# Patient Record
Sex: FEMALE | Race: WHITE | NOT HISPANIC OR LATINO | URBAN - METROPOLITAN AREA
[De-identification: names, ages, dates, MRNs, and addresses within clinical notes are randomized per-mention and may not be internally consistent; named-entity substitution may affect disease eponyms.]

---

## 2019-11-20 PROBLEM — 40739000 DYSPHAGIA: Status: ACTIVE | Noted: 2019-11-20

## 2019-11-20 PROBLEM — 266464001 HEMORRHAGE OF RECTUM AND ANUS: Status: ACTIVE | Noted: 2019-11-20

## 2019-11-20 PROBLEM — 30037006 ANAL FISSURE: Status: ACTIVE | Noted: 2019-11-20

## 2019-11-20 PROBLEM — 102485007 PERSONAL RISK FACTOR: Status: ACTIVE | Noted: 2019-11-20

## 2019-12-12 PROBLEM — 266435005 GASTRO-ESOPHAGEAL REFLUX DISEASE WITHOUT ESOPHAGITIS: Status: ACTIVE | Noted: 2019-12-12

## 2020-03-10 PROBLEM — 426546008 SACROCOCCYGEAL DISORDER: Status: ACTIVE | Noted: 2020-03-10

## 2021-12-16 ENCOUNTER — OFFICE VISIT (OUTPATIENT)
Dept: URBAN - METROPOLITAN AREA CLINIC 27 | Facility: CLINIC | Age: 36
End: 2021-12-16

## 2021-12-16 PROBLEM — 442076002 EARLY SATIETY: Status: ACTIVE | Noted: 2021-12-16

## 2022-01-13 ENCOUNTER — OFFICE VISIT (OUTPATIENT)
Dept: URBAN - METROPOLITAN AREA CLINIC 27 | Facility: CLINIC | Age: 37
End: 2022-01-13

## 2022-01-13 PROBLEM — 301717006 RIGHT UPPER QUADRANT PAIN: Status: ACTIVE | Noted: 2022-01-13

## 2022-01-26 ENCOUNTER — OFFICE VISIT (OUTPATIENT)
Dept: URBAN - METROPOLITAN AREA CLINIC 27 | Facility: CLINIC | Age: 37
End: 2022-01-26

## 2022-02-11 ENCOUNTER — OFFICE VISIT (OUTPATIENT)
Dept: URBAN - METROPOLITAN AREA CLINIC 27 | Facility: CLINIC | Age: 37
End: 2022-02-11

## 2022-04-13 ENCOUNTER — OFFICE VISIT (OUTPATIENT)
Dept: URBAN - METROPOLITAN AREA CLINIC 27 | Facility: CLINIC | Age: 37
End: 2022-04-13

## 2022-04-30 ENCOUNTER — TELEPHONE ENCOUNTER (OUTPATIENT)
Dept: URBAN - METROPOLITAN AREA CLINIC 121 | Facility: CLINIC | Age: 37
End: 2022-04-30

## 2022-04-30 RX ORDER — IBUPROFEN 200 MG
TABLET ORAL
OUTPATIENT
Start: 2019-11-20 | End: 2021-12-16

## 2022-04-30 RX ORDER — AMOXICILLIN AND CLAVULANATE POTASSIUM 250; 125 MG/1; MG/1
ONE TAB BY MOUTH TWICE DAILY TABLET, FILM COATED ORAL
OUTPATIENT
Start: 2020-02-13 | End: 2020-02-23

## 2022-05-01 ENCOUNTER — TELEPHONE ENCOUNTER (OUTPATIENT)
Dept: URBAN - METROPOLITAN AREA CLINIC 121 | Facility: CLINIC | Age: 37
End: 2022-05-01

## 2022-05-01 RX ORDER — ELECTROLYTES/DEXTROSE
SOLUTION, ORAL ORAL
Status: ACTIVE | COMMUNITY

## 2022-07-15 ENCOUNTER — LAB OUTSIDE AN ENCOUNTER (OUTPATIENT)
Dept: URBAN - METROPOLITAN AREA CLINIC 27 | Facility: CLINIC | Age: 37
End: 2022-07-15

## 2022-07-18 ENCOUNTER — LAB OUTSIDE AN ENCOUNTER (OUTPATIENT)
Dept: URBAN - METROPOLITAN AREA CLINIC 27 | Facility: CLINIC | Age: 37
End: 2022-07-18

## 2022-07-18 ENCOUNTER — WEB ENCOUNTER (OUTPATIENT)
Dept: URBAN - METROPOLITAN AREA CLINIC 98 | Facility: CLINIC | Age: 37
End: 2022-07-18

## 2022-07-18 ENCOUNTER — OFFICE VISIT (OUTPATIENT)
Dept: URBAN - METROPOLITAN AREA CLINIC 98 | Facility: CLINIC | Age: 37
End: 2022-07-18
Payer: COMMERCIAL

## 2022-07-18 VITALS
TEMPERATURE: 97.4 F | SYSTOLIC BLOOD PRESSURE: 95 MMHG | WEIGHT: 127 LBS | HEART RATE: 74 BPM | HEIGHT: 66 IN | BODY MASS INDEX: 20.41 KG/M2 | DIASTOLIC BLOOD PRESSURE: 64 MMHG

## 2022-07-18 DIAGNOSIS — R79.0 LOW FERRITIN: ICD-10-CM

## 2022-07-18 DIAGNOSIS — R10.9 RIGHT SIDED ABDOMINAL PAIN: ICD-10-CM

## 2022-07-18 DIAGNOSIS — K59.09 CHRONIC CONSTIPATION: ICD-10-CM

## 2022-07-18 PROCEDURE — 99214 OFFICE O/P EST MOD 30 MIN: CPT | Performed by: INTERNAL MEDICINE

## 2022-07-18 RX ORDER — ELECTROLYTES/DEXTROSE
SOLUTION, ORAL ORAL
Status: ACTIVE | COMMUNITY

## 2022-07-18 NOTE — HPI-TODAY'S VISIT:
She has had right sided abdominal pain for over 4 months.She is seen by Federico Swanson.   The pain began after taking Macrobid for UTI in December 2022. Pain has radiated from upper abdomen to lower abdomen. Liver labs were normal.  No findings on 3/8/22 CT to account for pain.  Pain thought to be functional vs. MSK, less likely IBD. Was to try IBGard and get FOBT. Stool kit given 7/15/22 (has not done this yet).   She is still having the pain. Most of the time she does not feel it.  She will feel it for an hour a few times per week.  It is mild (2/10).  She feels it when she presses. Eating and BMs do not change her symptoms.  She is having a BM every day, unsure if she is completely evacuating.  She is unsure what makes it feel better.  She has tried a probiotic.  For 2 years she has had early satiety with large meals. No vomiting. No NSAID use.  No weight loss.  No blood in the stools.  She has dysautonomia and reactive hypoglycemia.  She has had low ferritin in the past, heavy periods.   I reviewed:  3/8/22 CT A/P with IV contrast: moderate fecal stool burden

## 2022-07-19 LAB
FERRITIN, SERUM: 10
HEMATOCRIT: 41.6
HEMOGLOBIN: 13.7
IRON BIND.CAP.(TIBC): 373
IRON SATURATION: 23
IRON: 86
MCH: 29.8
MCHC: 32.9
MCV: 90.6
MPV: 11
PLATELET COUNT: 225
RDW: 12.1
RED BLOOD CELL COUNT: 4.59
WHITE BLOOD CELL COUNT: 6.2

## 2022-07-20 ENCOUNTER — TELEPHONE ENCOUNTER (OUTPATIENT)
Dept: URBAN - METROPOLITAN AREA CLINIC 6 | Facility: CLINIC | Age: 37
End: 2022-07-20

## 2022-07-20 ENCOUNTER — TELEPHONE ENCOUNTER (OUTPATIENT)
Dept: URBAN - METROPOLITAN AREA CLINIC 98 | Facility: CLINIC | Age: 37
End: 2022-07-20

## 2022-07-21 LAB
CAMPYLOBACTER SPP. AG,EIA: (no result)
CLOSTRIDIUM DIFFICILE: (no result)
FECAL FAT, QUALITATIVE: NORMAL
LEUKOCYTES: (no result)
Lab: (no result)
OVA AND PARASITES, CONC AND PERM SMEAR: (no result)
SALMONELLA AND SHIGELLA, CULTURE: (no result)
SHIGA TOXINS, EIA W/RFL TO E.COLI O157 CULTURE: (no result)

## 2022-07-22 LAB — OCCULT BLOOD, FECAL, IA: (no result)

## 2022-08-19 ENCOUNTER — OFFICE VISIT (OUTPATIENT)
Dept: URBAN - METROPOLITAN AREA TELEHEALTH 2 | Facility: TELEHEALTH | Age: 37
End: 2022-08-19
Payer: COMMERCIAL

## 2022-08-19 ENCOUNTER — TELEPHONE ENCOUNTER (OUTPATIENT)
Dept: URBAN - METROPOLITAN AREA CLINIC 6 | Facility: CLINIC | Age: 37
End: 2022-08-19

## 2022-08-19 VITALS — WEIGHT: 127 LBS | BODY MASS INDEX: 20.41 KG/M2 | HEIGHT: 66 IN

## 2022-08-19 DIAGNOSIS — K59.09 CHRONIC CONSTIPATION: ICD-10-CM

## 2022-08-19 DIAGNOSIS — R10.84 ABDOMINAL CRAMPING, GENERALIZED: ICD-10-CM

## 2022-08-19 DIAGNOSIS — R79.0 LOW FERRITIN: ICD-10-CM

## 2022-08-19 PROCEDURE — 99214 OFFICE O/P EST MOD 30 MIN: CPT | Performed by: INTERNAL MEDICINE

## 2022-08-19 RX ORDER — POLYETHYLENE GLYCOL 3350, SODIUM CHLORIDE, SODIUM BICARBONATE, POTASSIUM CHLORIDE 420; 11.2; 5.72; 1.48 G/4L; G/4L; G/4L; G/4L
AS DIRECTED POWDER, FOR SOLUTION ORAL ONCE
Qty: 420 GM | Refills: 0 | OUTPATIENT
Start: 2022-08-19 | End: 2022-08-20

## 2022-08-19 RX ORDER — ELECTROLYTES/DEXTROSE
SOLUTION, ORAL ORAL
COMMUNITY

## 2022-08-19 NOTE — HPI-TODAY'S VISIT:
Ms. Lima is a 37 yo F presnting for followup.  She is now taking iron supplementation for low ferritin.  She did this before and it increased her ferritin but then she stopped supplementation and her ferritin dropped to 10 again.  She is not sure if her periods are still heavy at this time.  Still having right sided abdominal pain daily and randomly throughout the day. Unrelated to food or eating.  I reviewed:  22 H/H 13.7/41.6, MCV 90.6, plts 225 22 ferritin 10 (low), iron 86 (normal) 22 fecal globin: normal  22 visit: She has had right sided abdominal pain for over 4 months.She is seen by Federico Swanson.   The pain began after taking Macrobid for UTI in 2022. Pain has radiated from upper abdomen to lower abdomen. Liver labs were normal.  No findings on 3/8/22 CT to account for pain.  Pain thought to be functional vs. MSK, less likely IBD. Was to try IBGard and get FOBT. Stool kit given 7/15/22 (has not done this yet).   She is still having the pain. Most of the time she does not feel it.  She will feel it for an hour a few times per week.  It is mild (2/10).  She feels it when she presses. Eating and BMs do not change her symptoms.  She is having a BM every day, unsure if she is completely evacuating.  She is unsure what makes it feel better.  She has tried a probiotic.  For 2 years she has had early satiety with large meals. No vomiting. No NSAID use.  No weight loss.  No blood in the stools.  She has dysautonomia and reactive hypoglycemia.  She has had low ferritin in the past, heavy periods.   I reviewed:  3/8/22 CT A/P with IV contrast: moderate fecal stool burden   Patient seen today via telehealth by agreement and consent of patient in light of current COVID-19 pandemic. I used video conferencing during the visit. The patient encounter is appropriate and reasonable under the circumstances given the patient's particular presentation at this time. The patient has been advised of the followin) the potential risks and limitations of this mode of treatment (including but not limited to the absence of in-person examination); 2) the right to refuse telehealth services at any point without affecting the right to future care; 3) the right to receive in-person services, included immediately after this consultation if an urgent need arises; 4) information, including identifiable images or information from this telehealth consult, will only be shared in accordance with HIPPA regulations. Any and all of the patient's and/or patient's family member's questions on this issue have been answered. The patient has verbally consented to be treated via telehealth services. The patient has also been advised to contact this office for worsening conditions or problems, and seek emergency medical treatment and/or call 911 if the patient deems either necessary.   More than half of the face-to-face time used for counseling and coordination of care.  The patient received telehealth services at: home

## 2022-08-19 NOTE — EXAM-FUNCTIONAL ASSESSMENT
Constitutional: well-developed, normal communication ability.   Eyes: Conjunctivae and eyelids appear normal, no scleral icterus.   Nose/nasopharynx, external nose: appear normal, normal appearance of lips.   Neck/thyroid: normal appearance   Chest: No visualized lesions or deformities.   Gastrointestinal:right sided abdominal tenderness on self palpation   Musculoskeletal: no visualized joint erythema or swelling   Skin: no rashes or jaundice   Neurologic: Oriented to person, place, time.    Psychiatric: Normal mood and appropriate affect.

## 2022-08-29 ENCOUNTER — LAB OUTSIDE AN ENCOUNTER (OUTPATIENT)
Dept: URBAN - METROPOLITAN AREA CLINIC 98 | Facility: CLINIC | Age: 37
End: 2022-08-29

## 2022-08-30 ENCOUNTER — WEB ENCOUNTER (OUTPATIENT)
Dept: URBAN - METROPOLITAN AREA CLINIC 98 | Facility: CLINIC | Age: 37
End: 2022-08-30

## 2022-08-31 LAB
IMMUNOGLOBULIN A, QN, SERUM: 102
INTERPRETATION: (no result)
T-TRANSGLUTAMINASE (TTG) IGA: <1

## 2022-09-08 ENCOUNTER — LAB OUTSIDE AN ENCOUNTER (OUTPATIENT)
Dept: URBAN - METROPOLITAN AREA CLINIC 98 | Facility: CLINIC | Age: 37
End: 2022-09-08

## 2022-09-14 LAB
FECAL FAT, QUALITATIVE: NORMAL
PANCREATIC ELASTASE, FECAL: >500

## 2022-09-26 ENCOUNTER — OFFICE VISIT (OUTPATIENT)
Dept: URBAN - METROPOLITAN AREA TELEHEALTH 2 | Facility: TELEHEALTH | Age: 37
End: 2022-09-26
Payer: COMMERCIAL

## 2022-09-26 VITALS — HEIGHT: 66 IN | WEIGHT: 127 LBS | BODY MASS INDEX: 20.41 KG/M2

## 2022-09-26 DIAGNOSIS — R10.84 ABDOMINAL CRAMPING, GENERALIZED: ICD-10-CM

## 2022-09-26 DIAGNOSIS — K59.09 CHRONIC CONSTIPATION: ICD-10-CM

## 2022-09-26 DIAGNOSIS — R79.0 LOW FERRITIN: ICD-10-CM

## 2022-09-26 PROCEDURE — 99214 OFFICE O/P EST MOD 30 MIN: CPT | Performed by: INTERNAL MEDICINE

## 2022-09-26 RX ORDER — TOPIRAMATE 25 MG/1
1 TABLET TABLET, COATED ORAL ONCE A DAY
Status: ACTIVE | COMMUNITY

## 2022-09-26 RX ORDER — ELECTROLYTES/DEXTROSE
SOLUTION, ORAL ORAL
COMMUNITY

## 2022-09-26 NOTE — HPI-TODAY'S VISIT:
Ms. Lima is a 36 yo F presenting for followup.  She wants to talk about the procedures again.  All lab testing has been normal except for a low ferritin.  No visible bleeding from her rectum.  She is still having lower right abdominal pain when she presses the area. She does not have it every day.  No weight loss at this time.  No nausea or vomiting. She is having more regular bowel movements at this point, soft, not skipping days.  I reviewed:  22 H/H 13.7/41.6, MCV 90.6, plts 225 22 ferritin 10 (low), iron 86 (normal) 22 fecal globin: normal 22 celiac: negative 22 panc elastase: normal 22 fecal fat: normal  22 visit: She has had right sided abdominal pain for over 4 months.She is seen by Federico Swanson.   The pain began after taking Macrobid for UTI in 2022. Pain has radiated from upper abdomen to lower abdomen. Liver labs were normal.  No findings on 3/8/22 CT to account for pain.  Pain thought to be functional vs. MSK, less likely IBD. Was to try IBGard and get FOBT. Stool kit given 7/15/22 (has not done this yet).   She is still having the pain. Most of the time she does not feel it.  She will feel it for an hour a few times per week.  It is mild (2/10).  She feels it when she presses. Eating and BMs do not change her symptoms.  She is having a BM every day, unsure if she is completely evacuating.  She is unsure what makes it feel better.  She has tried a probiotic.  For 2 years she has had early satiety with large meals. No vomiting. No NSAID use.  No weight loss.  No blood in the stools.  She has dysautonomia and reactive hypoglycemia.  She has had low ferritin in the past, heavy periods.   I reviewed:  3/8/22 CT A/P with IV contrast: moderate fecal stool burden    Patient seen today via telehealth by agreement and consent of patient in light of current COVID-19 pandemic. I used video conferencing during the visit. The patient encounter is appropriate and reasonable under the circumstances given the patient's particular presentation at this time. The patient has been advised of the followin) the potential risks and limitations of this mode of treatment (including but not limited to the absence of in-person examination); 2) the right to refuse telehealth services at any point without affecting the right to future care; 3) the right to receive in-person services, included immediately after this consultation if an urgent need arises; 4) information, including identifiable images or information from this telehealth consult, will only be shared in accordance with HIPPA regulations. Any and all of the patient's and/or patient's family member's questions on this issue have been answered. The patient has verbally consented to be treated via telehealth services. The patient has also been advised to contact this office for worsening conditions or problems, and seek emergency medical treatment and/or call 911 if the patient deems either necessary.   More than half of the face-to-face time used for counseling and coordination of care.  The patient received telehealth services at: home

## 2022-09-26 NOTE — EXAM-FUNCTIONAL ASSESSMENT
Constitutional: well-developed, normal communication ability.   Eyes: Conjunctivae and eyelids appear normal, no scleral icterus.   Nose/nasopharynx, external nose: appear normal, normal appearance of lips.   Neck/thyroid: normal appearance   Chest: No visualized lesions or deformities.   Gastrointestinal: no tenderness on self palpation   Musculoskeletal: no visualized joint erythema or swelling   Skin: no rashes or jaundice   Neurologic: Oriented to person, place, time.    Psychiatric: anxious

## 2022-09-27 ENCOUNTER — TELEPHONE ENCOUNTER (OUTPATIENT)
Dept: URBAN - METROPOLITAN AREA CLINIC 6 | Facility: CLINIC | Age: 37
End: 2022-09-27

## 2022-09-27 RX ORDER — POLYETHYLENE GLYCOL 3350, SODIUM CHLORIDE, SODIUM BICARBONATE, POTASSIUM CHLORIDE 420; 11.2; 5.72; 1.48 G/4L; G/4L; G/4L; G/4L
AS DIRECTED POWDER, FOR SOLUTION ORAL ONCE
Qty: 420 GM | Refills: 0
Start: 2022-08-19 | End: 2022-09-28

## 2022-11-16 ENCOUNTER — LAB OUTSIDE AN ENCOUNTER (OUTPATIENT)
Dept: URBAN - METROPOLITAN AREA CLINIC 98 | Facility: CLINIC | Age: 37
End: 2022-11-16

## 2022-11-16 ENCOUNTER — OFFICE VISIT (OUTPATIENT)
Dept: URBAN - METROPOLITAN AREA MEDICAL CENTER 28 | Facility: MEDICAL CENTER | Age: 37
End: 2022-11-16
Payer: COMMERCIAL

## 2022-11-16 DIAGNOSIS — K20.80 ESOPHAGITIS DISSECANS SUPERFICIALIS: ICD-10-CM

## 2022-11-16 DIAGNOSIS — K29.60 ADENOPAPILLOMATOSIS GASTRICA: ICD-10-CM

## 2022-11-16 DIAGNOSIS — D50.9 ANEMIA: ICD-10-CM

## 2022-11-16 DIAGNOSIS — K31.89 ACQUIRED DEFORMITY OF DUODENUM: ICD-10-CM

## 2022-11-16 DIAGNOSIS — R10.10 ABDOMINAL WALL PAIN IN RIGHT UPPER QUADRANT: ICD-10-CM

## 2022-11-16 DIAGNOSIS — K63.89 BACTERIAL OVERGROWTH SYNDROME: ICD-10-CM

## 2022-11-16 PROCEDURE — 43239 EGD BIOPSY SINGLE/MULTIPLE: CPT | Performed by: INTERNAL MEDICINE

## 2022-11-16 PROCEDURE — 45380 COLONOSCOPY AND BIOPSY: CPT | Performed by: INTERNAL MEDICINE

## 2022-11-19 LAB
GLUCOSE POC: 79
PERFORMING LAB: (no result)

## 2022-12-08 ENCOUNTER — WEB ENCOUNTER (OUTPATIENT)
Dept: URBAN - METROPOLITAN AREA CLINIC 98 | Facility: CLINIC | Age: 37
End: 2022-12-08

## 2022-12-14 ENCOUNTER — TELEPHONE ENCOUNTER (OUTPATIENT)
Dept: URBAN - METROPOLITAN AREA CLINIC 98 | Facility: CLINIC | Age: 37
End: 2022-12-14

## 2022-12-16 ENCOUNTER — OFFICE VISIT (OUTPATIENT)
Dept: URBAN - METROPOLITAN AREA TELEHEALTH 2 | Facility: TELEHEALTH | Age: 37
End: 2022-12-16

## 2022-12-19 ENCOUNTER — OFFICE VISIT (OUTPATIENT)
Dept: URBAN - METROPOLITAN AREA TELEHEALTH 2 | Facility: TELEHEALTH | Age: 37
End: 2022-12-19
Payer: COMMERCIAL

## 2022-12-19 ENCOUNTER — DASHBOARD ENCOUNTERS (OUTPATIENT)
Age: 37
End: 2022-12-19

## 2022-12-19 ENCOUNTER — ERX REFILL RESPONSE (OUTPATIENT)
Dept: URBAN - METROPOLITAN AREA CLINIC 98 | Facility: CLINIC | Age: 37
End: 2022-12-19

## 2022-12-19 VITALS — HEIGHT: 66 IN | WEIGHT: 127 LBS | BODY MASS INDEX: 20.41 KG/M2

## 2022-12-19 DIAGNOSIS — K21.00 GASTROESOPHAGEAL REFLUX DISEASE WITH ESOPHAGITIS WITHOUT HEMORRHAGE: ICD-10-CM

## 2022-12-19 DIAGNOSIS — R10.84 ABDOMINAL PAIN, GENERALIZED: ICD-10-CM

## 2022-12-19 DIAGNOSIS — R79.0 LOW FERRITIN: ICD-10-CM

## 2022-12-19 PROCEDURE — 99214 OFFICE O/P EST MOD 30 MIN: CPT | Performed by: INTERNAL MEDICINE

## 2022-12-19 RX ORDER — PANTOPRAZOLE SODIUM 20 MG/1
TAKE 1 TABLET BY MOUTH EVERY DAY 30 MINUTES BEFORE FIRST MEAL OF THE DAY TABLET, DELAYED RELEASE ORAL
Qty: 90 TABLET | Refills: 0 | OUTPATIENT

## 2022-12-19 RX ORDER — PANTOPRAZOLE SODIUM 20 MG/1
1 TABLET 30 MIN BEFORE FIRST MEAL OF THE DAY TABLET, DELAYED RELEASE ORAL ONCE A DAY
Qty: 30 | Refills: 1 | OUTPATIENT

## 2022-12-19 RX ORDER — FERROUS SULFATE 325(65) MG
1 TABLET TABLET ORAL ONCE A DAY
Status: ACTIVE | COMMUNITY

## 2022-12-19 RX ORDER — TOPIRAMATE 25 MG/1
1 TABLET TABLET, COATED ORAL ONCE A DAY
Status: ACTIVE | COMMUNITY

## 2022-12-19 RX ORDER — ELECTROLYTES/DEXTROSE
SOLUTION, ORAL ORAL
COMMUNITY

## 2022-12-19 NOTE — HPI-TODAY'S VISIT:
Ms. Lima is a 36 yo F presenting for followup.  Discussed EGD and colonoscopy results.  Taking Pepcid which helps with her belching symptoms.  No other signs of reflux generally.  Has been taking doxycycline for a few weeks and felt some throat irritation.  Pepcid has helped this. Ferritin has increased and iron and iron saturation are normal at this time. Right sided abdominal pain is still there. No nausea or vomiting.  I reviewed:  22 EGD: LA grade A esophagitis 22 colonoscopy: nodular teminal ileum 22 path EGD: inactive chronic gastritis, 12 eosinophils per high power field at [facility] 22 path colon: normal 22 H/H 13.7/41.6, MCV 90.6, plts 225 22 ferritin 10 (low), iron 86 (normal) 22 fecal globin: normal 22 celiac: negative 22 panc elastase: normal 22 fecal fat: normal  22 visit: She has had right sided abdominal pain for over 4 months.She is seen by Federico Swanson.   The pain began after taking Macrobid for UTI in 2022. Pain has radiated from upper abdomen to lower abdomen. Liver labs were normal.  No findings on 3/8/22 CT to account for pain.  Pain thought to be functional vs. MSK, less likely IBD. Was to try IBGard and get FOBT. Stool kit given 7/15/22 (has not done this yet).   She is still having the pain. Most of the time she does not feel it.  She will feel it for an hour a few times per week.  It is mild (2/10).  She feels it when she presses. Eating and BMs do not change her symptoms.  She is having a BM every day, unsure if she is completely evacuating.  She is unsure what makes it feel better.  She has tried a probiotic.  For 2 years she has had early satiety with large meals. No vomiting. No NSAID use.  No weight loss.  No blood in the stools.  She has dysautonomia and reactive hypoglycemia.  She has had low ferritin in the past, heavy periods.   I reviewed:  3/8/22 CT A/P with IV contrast: moderate fecal stool burden    Patient seen today via telehealth by agreement and consent of patient in light of current COVID-19 pandemic. I used video conferencing during the visit. The patient encounter is appropriate and reasonable under the circumstances given the patient's particular presentation at this time. The patient has been advised of the followin) the potential risks and limitations of this mode of treatment (including but not limited to the absence of in-person examination); 2) the right to refuse telehealth services at any point without affecting the right to future care; 3) the right to receive in-person services, included immediately after this consultation if an urgent need arises; 4) information, including identifiable images or information from this telehealth consult, will only be shared in accordance with HIPPA regulations. Any and all of the patient's and/or patient's family member's questions on this issue have been answered. The patient has verbally consented to be treated via telehealth services. The patient has also been advised to contact this office for worsening conditions or problems, and seek emergency medical treatment and/or call 911 if the patient deems either necessary.   More than half of the face-to-face time used for counseling and coordination of care.  The patient received telehealth services at: home

## 2022-12-30 PROBLEM — 266433003: Status: ACTIVE | Noted: 2022-12-19

## 2022-12-30 PROBLEM — 165626000: Status: ACTIVE | Noted: 2022-08-23

## 2023-01-09 ENCOUNTER — OFFICE VISIT (OUTPATIENT)
Dept: URBAN - METROPOLITAN AREA TELEHEALTH 2 | Facility: TELEHEALTH | Age: 38
End: 2023-01-09

## 2023-11-10 ENCOUNTER — WEB ENCOUNTER (OUTPATIENT)
Dept: URBAN - METROPOLITAN AREA CLINIC 98 | Facility: CLINIC | Age: 38
End: 2023-11-10

## 2024-05-06 ENCOUNTER — WEB ENCOUNTER (OUTPATIENT)
Dept: URBAN - METROPOLITAN AREA CLINIC 98 | Facility: CLINIC | Age: 39
End: 2024-05-06

## 2024-05-17 ENCOUNTER — OFFICE VISIT (OUTPATIENT)
Dept: URBAN - METROPOLITAN AREA CLINIC 98 | Facility: CLINIC | Age: 39
End: 2024-05-17

## 2024-08-30 ENCOUNTER — LAB OUTSIDE AN ENCOUNTER (OUTPATIENT)
Dept: URBAN - METROPOLITAN AREA CLINIC 98 | Facility: CLINIC | Age: 39
End: 2024-08-30

## 2024-08-30 ENCOUNTER — WEB ENCOUNTER (OUTPATIENT)
Dept: URBAN - METROPOLITAN AREA CLINIC 98 | Facility: CLINIC | Age: 39
End: 2024-08-30

## 2024-08-30 ENCOUNTER — OFFICE VISIT (OUTPATIENT)
Dept: URBAN - METROPOLITAN AREA CLINIC 98 | Facility: CLINIC | Age: 39
End: 2024-08-30
Payer: COMMERCIAL

## 2024-08-30 VITALS
WEIGHT: 121.6 LBS | HEART RATE: 81 BPM | DIASTOLIC BLOOD PRESSURE: 62 MMHG | SYSTOLIC BLOOD PRESSURE: 87 MMHG | TEMPERATURE: 97.2 F | BODY MASS INDEX: 19.54 KG/M2 | HEIGHT: 66 IN

## 2024-08-30 DIAGNOSIS — R10.13 EPIGASTRIC PAIN: ICD-10-CM

## 2024-08-30 DIAGNOSIS — R13.19 ESOPHAGEAL DYSPHAGIA: ICD-10-CM

## 2024-08-30 DIAGNOSIS — R09.89 CHRONIC THROAT CLEARING: ICD-10-CM

## 2024-08-30 PROCEDURE — 99214 OFFICE O/P EST MOD 30 MIN: CPT | Performed by: INTERNAL MEDICINE

## 2024-08-30 RX ORDER — ELECTROLYTES/DEXTROSE
SOLUTION, ORAL ORAL
COMMUNITY

## 2024-08-30 RX ORDER — FLUTICASONE PROPIONATE 110 UG/1
INHALE 2 PUFFS BY MOUTH TWICE DAILY AEROSOL, METERED RESPIRATORY (INHALATION)
Qty: 12 GRAM | Refills: 1 | Status: ACTIVE | COMMUNITY

## 2024-08-30 RX ORDER — ESCITALOPRAM OXALATE 10 MG/1
TAKE 1 TABLET BY MOUTH EVERY DAY TABLET ORAL
Qty: 30 EACH | Refills: 0 | Status: ACTIVE | COMMUNITY

## 2024-08-30 RX ORDER — TOPIRAMATE 25 MG/1
1 TABLET TABLET, COATED ORAL ONCE A DAY
Status: ACTIVE | COMMUNITY

## 2024-08-30 RX ORDER — PANTOPRAZOLE SODIUM 20 MG/1
TAKE 1 TABLET BY MOUTH EVERY DAY 30 MINUTES BEFORE FIRST MEAL OF THE DAY TABLET, DELAYED RELEASE ORAL
Qty: 90 TABLET | Refills: 0 | Status: ON HOLD | COMMUNITY

## 2024-08-30 RX ORDER — FERROUS SULFATE 325(65) MG
1 TABLET TABLET ORAL ONCE A DAY
Status: ACTIVE | COMMUNITY

## 2024-08-30 RX ORDER — DUPILUMAB 300 MG/2ML
INJECTION, SOLUTION SUBCUTANEOUS
Qty: 4 MILLILITER | Status: ACTIVE | COMMUNITY

## 2024-08-30 NOTE — HPI-TODAY'S VISIT:
Ms. Lima is a 37 yo F presenting for followup for upper abdominal pain. Last visit on 12/19/22 Discussed EGD and colonoscopy results.  Taking Pepcid which helps with her belching symptoms.  Chronic upper abdominal pain- has been on pantoprazole 20 mg daily for this in the past In the past 2 weeks she has had a new pain, epigastric - dull pain, 3/10 in severity Happens intermittently Started after lifting up a 20 lb dog, noticed the pain the next day Unsure if there is a change with eating or bowel movements, pain worse with movement No nausea or vomiting Rare NSAID use No change in bowel habits, no change in weight No other changes in medications or diet with this  Has a throat-clearing problem, has had this for years Feels that it could be mucus Has been having trouble swallowing pills, has to eat food in order to get them down  I reviewed:  11/16/22 EGD: LA grade A esophagitis 11/16/22 colonoscopy: nodular teminal ileum 11/16/22 path EGD: inactive chronic gastritis, 12 eosinophils per high power field at [facility] 11/16/22 path colon: normal 7/18/22 H/H 13.7/41.6, MCV 90.6, plts 225 7/18/22 ferritin 10 (low), iron 86 (normal) 7/18/22 fecal globin: normal 9/8/22 celiac: negative 9/8/22 panc elastase: normal 9/8/22 fecal fat: normal  7/14/22 visit: She has had right sided abdominal pain for over 4 months.She is seen by Federico Swanson.   The pain began after taking Macrobid for UTI in December 2022. Pain has radiated from upper abdomen to lower abdomen. Liver labs were normal.  No findings on 3/8/22 CT to account for pain.  Pain thought to be functional vs. MSK, less likely IBD. Was to try IBGard and get FOBT. Stool kit given 7/15/22 (has not done this yet).   She is still having the pain. Most of the time she does not feel it.  She will feel it for an hour a few times per week.  It is mild (2/10).  She feels it when she presses. Eating and BMs do not change her symptoms.  She is having a BM every day, unsure if she is completely evacuating.  She is unsure what makes it feel better.  She has tried a probiotic.  For 2 years she has had early satiety with large meals. No vomiting. No NSAID use.  No weight loss.  No blood in the stools.  She has dysautonomia and reactive hypoglycemia.  She has had low ferritin in the past, heavy periods.   I reviewed:  3/8/22 CT A/P with IV contrast: moderate fecal stool burden

## 2024-09-24 ENCOUNTER — LAB OUTSIDE AN ENCOUNTER (OUTPATIENT)
Dept: URBAN - METROPOLITAN AREA CLINIC 98 | Facility: CLINIC | Age: 39
End: 2024-09-24

## 2024-09-27 ENCOUNTER — LAB OUTSIDE AN ENCOUNTER (OUTPATIENT)
Dept: URBAN - METROPOLITAN AREA CLINIC 98 | Facility: CLINIC | Age: 39
End: 2024-09-27

## 2024-09-27 ENCOUNTER — OFFICE VISIT (OUTPATIENT)
Dept: URBAN - METROPOLITAN AREA CLINIC 98 | Facility: CLINIC | Age: 39
End: 2024-09-27
Payer: COMMERCIAL

## 2024-09-27 ENCOUNTER — TELEPHONE ENCOUNTER (OUTPATIENT)
Dept: URBAN - METROPOLITAN AREA CLINIC 98 | Facility: CLINIC | Age: 39
End: 2024-09-27

## 2024-09-27 VITALS — WEIGHT: 121 LBS | HEIGHT: 66 IN | BODY MASS INDEX: 19.44 KG/M2

## 2024-09-27 DIAGNOSIS — R13.19 ESOPHAGEAL DYSPHAGIA: ICD-10-CM

## 2024-09-27 DIAGNOSIS — R09.89 CHRONIC THROAT CLEARING: ICD-10-CM

## 2024-09-27 DIAGNOSIS — R10.13 EPIGASTRIC PAIN: ICD-10-CM

## 2024-09-27 PROCEDURE — 99214 OFFICE O/P EST MOD 30 MIN: CPT | Performed by: INTERNAL MEDICINE

## 2024-09-27 RX ORDER — ELECTROLYTES/DEXTROSE
SOLUTION, ORAL ORAL
COMMUNITY

## 2024-09-27 RX ORDER — PANTOPRAZOLE SODIUM 20 MG/1
TAKE 1 TABLET BY MOUTH EVERY DAY 30 MINUTES BEFORE FIRST MEAL OF THE DAY TABLET, DELAYED RELEASE ORAL
Qty: 90 TABLET | Refills: 0 | Status: ON HOLD | COMMUNITY

## 2024-09-27 RX ORDER — FERROUS SULFATE 325(65) MG
1 TABLET TABLET ORAL ONCE A DAY
Status: ACTIVE | COMMUNITY

## 2024-09-27 RX ORDER — DUPILUMAB 300 MG/2ML
INJECTION, SOLUTION SUBCUTANEOUS
Qty: 4 MILLILITER | Status: ACTIVE | COMMUNITY

## 2024-09-27 RX ORDER — ESCITALOPRAM OXALATE 10 MG/1
TAKE 1 TABLET BY MOUTH EVERY DAY TABLET ORAL
Qty: 30 EACH | Refills: 0 | Status: ACTIVE | COMMUNITY

## 2024-09-27 RX ORDER — FLUTICASONE PROPIONATE 110 UG/1
INHALE 2 PUFFS BY MOUTH TWICE DAILY AEROSOL, METERED RESPIRATORY (INHALATION)
Qty: 12 GRAM | Refills: 1 | Status: ACTIVE | COMMUNITY

## 2024-09-27 RX ORDER — TOPIRAMATE 25 MG/1
1 TABLET TABLET, COATED ORAL ONCE A DAY
Status: ACTIVE | COMMUNITY

## 2024-09-27 NOTE — HPI-TODAY'S VISIT:
Ms. Lima is a 38 yo F presenting for followup for upper abdominal pain. Last visit on 24  Still having the same throat-clearing issues and needs to drink a lot of water to get solid food down Has not tried Flonase yet Wants to have upper endoscopy to have this checked  I reviewed:  22 EGD: LA grade A esophagitis 22 colonoscopy: nodular teminal ileum 22 path EGD: inactive chronic gastritis, 12 eosinophils per high power field at [facility] 22 path colon: normal 22 H/H 13.7/41.6, MCV 90.6, plts 225 22 ferritin 10 (low), iron 86 (normal) 22 fecal globin: normal 22 celiac: negative 22 panc elastase: normal 22 fecal fat: normal  22 visit: She has had right sided abdominal pain for over 4 months.She is seen by Federico Swanson.   The pain began after taking Macrobid for UTI in 2022. Pain has radiated from upper abdomen to lower abdomen. Liver labs were normal.  No findings on 3/8/22 CT to account for pain.  Pain thought to be functional vs. MSK, less likely IBD. Was to try IBGard and get FOBT. Stool kit given 7/15/22 (has not done this yet).   She is still having the pain. Most of the time she does not feel it.  She will feel it for an hour a few times per week.  It is mild (2/10).  She feels it when she presses. Eating and BMs do not change her symptoms.  She is having a BM every day, unsure if she is completely evacuating.  She is unsure what makes it feel better.  She has tried a probiotic.  For 2 years she has had early satiety with large meals. No vomiting.  Patient seen today via telehealth by agreement and consent of patient in light of current COVID-19 pandemic. I used video conferencing during the visit. The patient encounter is appropriate and reasonable under the circumstances given the patient's particular presentation at this time. The patient has been advised of the followin) the potential risks and limitations of this mode of treatment (including but not limited to the absence of in-person examination); 2) the right to refuse telehealth services at any point without affecting the right to future care; 3) the right to receive in-person services, included immediately after this consultation if an urgent need arises; 4) information, including identifiable images or information from this telehealth consult, will only be shared in accordance with HIPAA regulations. Any and all of the patient's and/or patient's family member's questions on this issue have been answered. The patient has verbally consented to be treated via telehealth services. The patient has also been advised to contact this office for worsening conditions or problems, and seek emergency medical treatment and/or call 911 if the patient deems either necessary. More than half of the face-to-face time used for counseling and coordination of care. Visit takes place in patient's home  No NSAID use.  No weight loss.  No blood in the stools.  She has dysautonomia and reactive hypoglycemia.  She has had low ferritin in the past, heavy periods.   I reviewed:  3/8/22 CT A/P with IV contrast: moderate fecal stool burden

## 2024-11-01 ENCOUNTER — TELEPHONE ENCOUNTER (OUTPATIENT)
Dept: URBAN - METROPOLITAN AREA CLINIC 98 | Facility: CLINIC | Age: 39
End: 2024-11-01

## 2024-11-06 ENCOUNTER — OFFICE VISIT (OUTPATIENT)
Dept: URBAN - METROPOLITAN AREA MEDICAL CENTER 28 | Facility: MEDICAL CENTER | Age: 39
End: 2024-11-06